# Patient Record
Sex: MALE | Race: WHITE | NOT HISPANIC OR LATINO | Employment: FULL TIME | URBAN - METROPOLITAN AREA
[De-identification: names, ages, dates, MRNs, and addresses within clinical notes are randomized per-mention and may not be internally consistent; named-entity substitution may affect disease eponyms.]

---

## 2019-08-24 ENCOUNTER — OFFICE VISIT (OUTPATIENT)
Dept: URGENT CARE | Facility: CLINIC | Age: 49
End: 2019-08-24

## 2019-08-24 VITALS
RESPIRATION RATE: 15 BRPM | BODY MASS INDEX: 25.74 KG/M2 | OXYGEN SATURATION: 96 % | SYSTOLIC BLOOD PRESSURE: 132 MMHG | TEMPERATURE: 98.6 F | HEIGHT: 67 IN | WEIGHT: 164 LBS | DIASTOLIC BLOOD PRESSURE: 82 MMHG | HEART RATE: 88 BPM

## 2019-08-24 DIAGNOSIS — I10 HYPERTENSION, UNSPECIFIED TYPE: ICD-10-CM

## 2019-08-24 PROCEDURE — 99204 OFFICE O/P NEW MOD 45 MIN: CPT | Performed by: FAMILY MEDICINE

## 2019-08-24 RX ORDER — BISOPROLOL FUMARATE 5 MG/1
5 TABLET, FILM COATED ORAL DAILY
COMMUNITY

## 2019-08-24 RX ORDER — BISOPROLOL FUMARATE 5 MG/1
5 TABLET, FILM COATED ORAL DAILY
Qty: 14 TAB | Refills: 0 | Status: SHIPPED | OUTPATIENT
Start: 2019-08-24

## 2019-08-25 NOTE — PROGRESS NOTES
"Subjective:      Chief Complaint   Patient presents with   • Medication Refill     BP medication                   Hypertension  This is a new problem to me. The current episode started more than 1 month ago. The problem is unchanged. The problem is generally controlled on beta blocker, but he is traveling and forgot his medication at home, in [place].   He was not having any side effects.        Associated symptoms include : none. Pertinent negatives include no anxiety, blurred vision, chest pain, malaise/fatigue, orthopnea, palpitations or shortness of breath. There are no associated agents to hypertension. There are no known risk factors for coronary artery disease. There are no compliance problems.  There is no history of angina, kidney disease or CAD/MI.           Current Outpatient Medications on File Prior to Visit   Medication Sig Dispense Refill   • bisoprolol (ZEBETA) 5 MG Tab Take 5 mg by mouth every day.       No current facility-administered medications on file prior to visit.       Social hx - denies tobacco, alcohol, drug use       past med hx:  HTN    Family history was reviewed and not pertinent           Review of Systems   Constitutional: Negative for fever, chills and malaise/fatigue.   Eyes: Negative for vision changes, d/c.    Respiratory: Negative for cough and sputum production.    Cardiovascular: Negative for chest pain and palpitations.   Gastrointestinal: Negative for nausea, vomiting, abdominal pain, diarrhea and constipation.   Genitourinary: Negative for dysuria, urgency and frequency.   Skin: Negative for rash or  itching.   Neurological: Negative for dizziness and tingling.   Psychiatric/Behavioral: Negative for depression.   Hematologic/lymphatic - denies bruising or excessive bleeding  All other systems reviewed and are negative.          Objective:     /82   Pulse 88   Temp 37 °C (98.6 °F) (Temporal)   Resp 15   Ht 1.702 m (5' 7\")   Wt 74.4 kg (164 lb)   SpO2 96% "       Physical Exam   Constitutional: pt is oriented to person, place, and time. Pt appears well-developed and well-nourished. No distress.   HENT:   Head: Normocephalic and atraumatic.   Mouth/Throat:   Eyes: Conjunctivae and EOM are normal. Pupils are equal, round, and reactive to light. Right eye exhibits no discharge. Left eye exhibits no discharge. No scleral icterus.   Cardiovascular: Normal rate, regular rhythm, normal heart sounds and intact distal pulses.  Exam reveals no friction rub.    No murmur heard.  Pulmonary/Chest: Effort normal and breath sounds normal. No respiratory distress. Pt has no wheezes. Pt has no rales.   Neurological: pt is alert and oriented to person, place, and time. No cranial nerve deficit.   Skin: Skin is warm. Pt is not diaphoretic. No erythema.   Psychiatric: pt behavior is normal.   Nursing note and vitals reviewed.              Assessment/Plan:     1. Hypertension, unspecified type   stable.   Will refill bisoprolol      - bisoprolol (ZEBETA) 5 MG Tab; Take 1 Tab by mouth every day.  Dispense: 14 Tab; Refill: 0